# Patient Record
Sex: FEMALE | Race: WHITE | NOT HISPANIC OR LATINO | Employment: UNEMPLOYED | URBAN - METROPOLITAN AREA
[De-identification: names, ages, dates, MRNs, and addresses within clinical notes are randomized per-mention and may not be internally consistent; named-entity substitution may affect disease eponyms.]

---

## 2024-05-06 ENCOUNTER — APPOINTMENT (OUTPATIENT)
Dept: CARDIOLOGY | Facility: CLINIC | Age: 84
End: 2024-05-06
Payer: MEDICARE

## 2024-05-15 ENCOUNTER — APPOINTMENT (OUTPATIENT)
Dept: CARDIOLOGY | Facility: CLINIC | Age: 84
End: 2024-05-15
Payer: MEDICARE

## 2024-05-21 PROBLEM — Z86.0100 HISTORY OF COLON POLYPS: Status: ACTIVE | Noted: 2024-05-21

## 2024-05-21 PROBLEM — M47.816 LUMBAR SPONDYLOSIS: Status: ACTIVE | Noted: 2024-05-21

## 2024-05-21 PROBLEM — I25.84 CORONARY ARTERY CALCIFICATION: Status: ACTIVE | Noted: 2024-05-21

## 2024-05-21 PROBLEM — E05.90 HYPERTHYROIDISM: Status: ACTIVE | Noted: 2024-05-21

## 2024-05-21 PROBLEM — R79.89: Status: ACTIVE | Noted: 2024-05-21

## 2024-05-21 PROBLEM — Z86.010 HISTORY OF COLON POLYPS: Status: ACTIVE | Noted: 2024-05-21

## 2024-05-21 PROBLEM — Z86.69 H/O: BELL'S PALSY: Status: ACTIVE | Noted: 2024-05-21

## 2024-05-21 PROBLEM — E11.29 DIABETES MELLITUS WITH MICROALBUMINURIA (MULTI): Status: ACTIVE | Noted: 2024-05-21

## 2024-05-21 PROBLEM — L98.9 CHANGING SKIN LESION: Status: ACTIVE | Noted: 2024-05-21

## 2024-05-21 PROBLEM — M85.9 DISORDER OF BONE DENSITY AND STRUCTURE, UNSPECIFIED: Status: ACTIVE | Noted: 2024-05-21

## 2024-05-21 PROBLEM — I25.10 CORONARY ARTERY CALCIFICATION: Status: ACTIVE | Noted: 2024-05-21

## 2024-05-21 PROBLEM — I10 HYPERTENSION: Status: ACTIVE | Noted: 2024-05-21

## 2024-05-21 PROBLEM — E55.9 VITAMIN D DEFICIENCY: Status: ACTIVE | Noted: 2024-05-21

## 2024-05-21 PROBLEM — M19.90 ARTHRITIS: Status: ACTIVE | Noted: 2024-05-21

## 2024-05-21 PROBLEM — L60.2 NAIL HYPERTROPHY: Status: ACTIVE | Noted: 2024-05-21

## 2024-05-21 PROBLEM — R80.9 DIABETES MELLITUS WITH MICROALBUMINURIA (MULTI): Status: ACTIVE | Noted: 2024-05-21

## 2024-05-21 PROBLEM — M17.9 OSTEOARTHROSIS OF KNEE: Status: ACTIVE | Noted: 2024-05-21

## 2024-05-21 RX ORDER — GLIMEPIRIDE 4 MG/1
4 TABLET ORAL
COMMUNITY
Start: 2018-09-12

## 2024-05-21 RX ORDER — ASPIRIN 81 MG/1
81 TABLET ORAL DAILY
COMMUNITY
Start: 2019-08-02

## 2024-05-21 RX ORDER — LISINOPRIL AND HYDROCHLOROTHIAZIDE 20; 25 MG/1; MG/1
1 TABLET ORAL DAILY
COMMUNITY
Start: 2018-09-12 | End: 2024-05-29 | Stop reason: WASHOUT

## 2024-05-21 RX ORDER — ATORVASTATIN CALCIUM 40 MG/1
40 TABLET, FILM COATED ORAL DAILY
COMMUNITY
Start: 2019-11-01

## 2024-05-21 RX ORDER — FLUTICASONE PROPIONATE 50 MCG
SPRAY, SUSPENSION (ML) NASAL
COMMUNITY
Start: 2018-09-12

## 2024-05-21 RX ORDER — PIOGLITAZONEHYDROCHLORIDE 45 MG/1
45 TABLET ORAL DAILY
COMMUNITY
Start: 2018-09-12 | End: 2024-05-29 | Stop reason: WASHOUT

## 2024-05-21 RX ORDER — CHOLECALCIFEROL (VITAMIN D3) 125 MCG
50 TABLET ORAL
COMMUNITY
Start: 2018-09-12

## 2024-05-21 RX ORDER — BLOOD-GLUCOSE METER
EACH MISCELLANEOUS 2 TIMES DAILY
COMMUNITY
Start: 2019-03-11

## 2024-05-21 RX ORDER — AMOXICILLIN 500 MG/1
500 CAPSULE ORAL
COMMUNITY
Start: 2020-08-11 | End: 2024-05-29 | Stop reason: WASHOUT

## 2024-05-21 RX ORDER — LISINOPRIL 20 MG/1
20 TABLET ORAL DAILY
COMMUNITY
Start: 2020-03-03

## 2024-05-21 RX ORDER — PHENOL 1.4 %
1 AEROSOL, SPRAY (ML) MUCOUS MEMBRANE DAILY
COMMUNITY
Start: 2019-07-16

## 2024-05-21 RX ORDER — AMLODIPINE BESYLATE 5 MG/1
5 TABLET ORAL DAILY
COMMUNITY
Start: 2018-09-12 | End: 2024-05-29 | Stop reason: WASHOUT

## 2024-05-22 ENCOUNTER — APPOINTMENT (OUTPATIENT)
Dept: CARDIOLOGY | Facility: CLINIC | Age: 84
End: 2024-05-22
Payer: MEDICARE

## 2024-05-29 ENCOUNTER — OFFICE VISIT (OUTPATIENT)
Dept: CARDIOLOGY | Facility: CLINIC | Age: 84
End: 2024-05-29
Payer: MEDICARE

## 2024-05-29 VITALS
HEIGHT: 63 IN | HEART RATE: 76 BPM | WEIGHT: 175 LBS | SYSTOLIC BLOOD PRESSURE: 140 MMHG | DIASTOLIC BLOOD PRESSURE: 90 MMHG | BODY MASS INDEX: 31.01 KG/M2 | OXYGEN SATURATION: 98 %

## 2024-05-29 DIAGNOSIS — L98.9 CHANGING SKIN LESION: Primary | ICD-10-CM

## 2024-05-29 PROCEDURE — 3080F DIAST BP >= 90 MM HG: CPT | Performed by: INTERNAL MEDICINE

## 2024-05-29 PROCEDURE — 1036F TOBACCO NON-USER: CPT | Performed by: INTERNAL MEDICINE

## 2024-05-29 PROCEDURE — 99203 OFFICE O/P NEW LOW 30 MIN: CPT | Performed by: INTERNAL MEDICINE

## 2024-05-29 PROCEDURE — 1159F MED LIST DOCD IN RCRD: CPT | Performed by: INTERNAL MEDICINE

## 2024-05-29 PROCEDURE — 3077F SYST BP >= 140 MM HG: CPT | Performed by: INTERNAL MEDICINE

## 2024-05-29 ASSESSMENT — ENCOUNTER SYMPTOMS
GASTROINTESTINAL NEGATIVE: 1
NEUROLOGICAL NEGATIVE: 1
CONSTITUTIONAL NEGATIVE: 1
MUSCULOSKELETAL NEGATIVE: 1
COLOR CHANGE: 1
PSYCHIATRIC NEGATIVE: 1
HEMATOLOGIC/LYMPHATIC NEGATIVE: 1
RESPIRATORY NEGATIVE: 1

## 2024-05-29 NOTE — PROGRESS NOTES
"Subjective   Patient ID: Emily Silva is a 83 y.o. female who presents for No chief complaint on file..    HPI   Very pleasant 83 year old female presents with lower extremity discoloration and \"weeping\" legs. Denies itchiness. Notes these sx started a year ago and are on and off. Denies any nocturnal pain that wakes her up from sleep. Denies any bruising or pain.   Other factors include diabetes and hypertension. Denies fevers, chills, night sweats.     Review of Systems   Constitutional: Negative.    HENT: Negative.     Respiratory: Negative.     Cardiovascular:  Positive for leg swelling.   Gastrointestinal: Negative.    Musculoskeletal: Negative.    Skin:  Positive for color change.   Neurological: Negative.    Hematological: Negative.    Psychiatric/Behavioral: Negative.         Objective   /90 (BP Location: Left arm, Patient Position: Sitting)   Pulse 76   Ht 1.6 m (5' 3\")   Wt 79.4 kg (175 lb)   SpO2 98%   BMI 31.00 kg/m²     Physical Exam  Vitals reviewed.   Constitutional:       Appearance: Normal appearance.   HENT:      Head: Normocephalic and atraumatic.      Nose: Nose normal.   Cardiovascular:      Rate and Rhythm: Normal rate and regular rhythm.      Pulses: Normal pulses.      Heart sounds: Normal heart sounds.   Pulmonary:      Effort: Pulmonary effort is normal.      Breath sounds: Normal breath sounds.   Musculoskeletal:      Right lower leg: Edema present.      Left lower leg: Edema present.   Skin:     Findings: Rash present.   Neurological:      General: No focal deficit present.      Mental Status: She is alert and oriented to person, place, and time.   Psychiatric:         Mood and Affect: Mood normal.         Behavior: Behavior normal.         Assessment/Plan   There are no diagnoses linked to this encounter.  83 year old female patient with evidence of high venous pressure in bilateral lower extremities presents here today with lower extremity discoloration. Patient's leg is " swollen, she has non-pitting edema bilaterally, circumferential rash affecting both legs at the same level- mid meta tarsal all the way below the knee, same pattern and area as the compression garment, most likely contact dermatitis from compression stockings.     1- Patient will benefit from TUBIGRIP compression socks. Prescription provided.  In addition to skin care and applying hydrocortisone cream to what appears to be contact dermatitis we will follow-up with the patient in 2 weeks from now to see how she is progressing

## 2024-06-20 ENCOUNTER — APPOINTMENT (OUTPATIENT)
Dept: CARDIOLOGY | Facility: CLINIC | Age: 84
End: 2024-06-20
Payer: MEDICARE

## 2024-06-20 VITALS
BODY MASS INDEX: 31.01 KG/M2 | HEIGHT: 63 IN | HEART RATE: 77 BPM | SYSTOLIC BLOOD PRESSURE: 140 MMHG | WEIGHT: 175 LBS | OXYGEN SATURATION: 97 % | DIASTOLIC BLOOD PRESSURE: 70 MMHG

## 2024-06-20 DIAGNOSIS — I89.0 LYMPHEDEMA: Primary | ICD-10-CM

## 2024-06-20 DIAGNOSIS — M47.816 LUMBAR SPONDYLOSIS: ICD-10-CM

## 2024-06-20 PROCEDURE — 3077F SYST BP >= 140 MM HG: CPT | Performed by: INTERNAL MEDICINE

## 2024-06-20 PROCEDURE — 99213 OFFICE O/P EST LOW 20 MIN: CPT | Performed by: INTERNAL MEDICINE

## 2024-06-20 PROCEDURE — 3078F DIAST BP <80 MM HG: CPT | Performed by: INTERNAL MEDICINE

## 2024-06-20 PROCEDURE — 1159F MED LIST DOCD IN RCRD: CPT | Performed by: INTERNAL MEDICINE

## 2024-06-20 PROCEDURE — 1036F TOBACCO NON-USER: CPT | Performed by: INTERNAL MEDICINE

## 2024-06-20 RX ORDER — DAPAGLIFLOZIN 10 MG/1
5 TABLET, FILM COATED ORAL EVERY 24 HOURS
COMMUNITY

## 2024-06-20 ASSESSMENT — ENCOUNTER SYMPTOMS
COLOR CHANGE: 1
PSYCHIATRIC NEGATIVE: 1
MUSCULOSKELETAL NEGATIVE: 1
HEMATOLOGIC/LYMPHATIC NEGATIVE: 1
CONSTITUTIONAL NEGATIVE: 1
NEUROLOGICAL NEGATIVE: 1
GASTROINTESTINAL NEGATIVE: 1
RESPIRATORY NEGATIVE: 1

## 2024-06-20 NOTE — PROGRESS NOTES
"Subjective   Patient ID: Emily Silva is a 83 y.o. female who presents for follow-up    HPI   Very pleasant 83 year old female presents with lower extremity discoloration and \"weeping\" legs. Denies itchiness. Notes these sx started a year ago and are on and off. Denies any nocturnal pain that wakes her up from sleep. Denies any bruising or pain.   Other factors include diabetes and hypertension. Denies fevers, chills, night sweats. Patient has been wearing the Tubigrips and the weeping has stopped. Still experiences pain and notes her legs are very red.     Review of Systems   Constitutional: Negative.    HENT: Negative.     Respiratory: Negative.     Cardiovascular:  Positive for leg swelling.   Gastrointestinal: Negative.    Musculoskeletal: Negative.    Skin:  Positive for color change.   Neurological: Negative.    Hematological: Negative.    Psychiatric/Behavioral: Negative.       Evidence of venous inflammation  Objective   /70 (BP Location: Right arm, Patient Position: Sitting)   Pulse 77   Ht 1.6 m (5' 3\")   Wt 79.4 kg (175 lb)   SpO2 97%   BMI 31.00 kg/m²     Physical Exam  Vitals reviewed.   Constitutional:       Appearance: Normal appearance.   HENT:      Head: Normocephalic and atraumatic.      Nose: Nose normal.   Cardiovascular:      Rate and Rhythm: Normal rate and regular rhythm.      Pulses: Normal pulses.      Heart sounds: Normal heart sounds.   Pulmonary:      Effort: Pulmonary effort is normal.      Breath sounds: Normal breath sounds.   Musculoskeletal:      Right lower leg: Edema present.      Left lower leg: Edema present.   Skin:     Findings: Rash present.   Neurological:      General: No focal deficit present.      Mental Status: She is alert and oriented to person, place, and time.   Psychiatric:         Mood and Affect: Mood normal.         Behavior: Behavior normal.     Evidence of venous inflammation, bilateral, evidence of secondary lymphedema with nonpitting " edema    Assessment/Plan   There are no diagnoses linked to this encounter.  83 year old female patient with evidence of high venous pressure in bilateral lower extremities presents here today with lower extremity discoloration. Patient's leg is swollen, she has non-pitting edema bilaterally, circumferential rash affecting both legs at the same level- mid meta tarsal all the way below the knee, same pattern and area as the compression garment, most likely contact dermatitis from compression stockings. Patient's sx are consistent with venous inflammation, not cellulitis.     1- Patient will benefit from lymphedema PT sessions. Referral provided.   2- Patient will benefit from compression stockings once swelling is down with lymphedema PT.   3- Follow up after for re-assessment in 2 months    Scribe Attestation  By signing my name below, IAdri , Scribe   attest that this documentation has been prepared under the direction and in the presence of Nii Whaley MD.

## 2024-08-06 ENCOUNTER — EVALUATION (OUTPATIENT)
Dept: OCCUPATIONAL THERAPY | Facility: CLINIC | Age: 84
End: 2024-08-06
Payer: MEDICARE

## 2024-08-06 DIAGNOSIS — M79.604 BILATERAL LOWER EXTREMITY PAIN: ICD-10-CM

## 2024-08-06 DIAGNOSIS — M62.81 GENERALIZED MUSCLE WEAKNESS: ICD-10-CM

## 2024-08-06 DIAGNOSIS — M79.605 BILATERAL LOWER EXTREMITY PAIN: ICD-10-CM

## 2024-08-06 DIAGNOSIS — I89.0 LYMPHEDEMA OF BOTH LOWER EXTREMITIES: Primary | ICD-10-CM

## 2024-08-06 DIAGNOSIS — M25.672 STIFFNESS OF BOTH ANKLE JOINTS: ICD-10-CM

## 2024-08-06 DIAGNOSIS — M25.671 STIFFNESS OF BOTH ANKLE JOINTS: ICD-10-CM

## 2024-08-06 PROCEDURE — 97166 OT EVAL MOD COMPLEX 45 MIN: CPT | Mod: GO

## 2024-08-06 PROCEDURE — 97535 SELF CARE MNGMENT TRAINING: CPT | Mod: GO

## 2024-08-06 ASSESSMENT — ENCOUNTER SYMPTOMS
DEPRESSION: 0
LOSS OF SENSATION IN FEET: 0
OCCASIONAL FEELINGS OF UNSTEADINESS: 0

## 2024-08-06 ASSESSMENT — ACTIVITIES OF DAILY LIVING (ADL): HOME_MANAGEMENT_TIME_ENTRY: 10

## 2024-08-06 NOTE — PROGRESS NOTES
Occupational Therapy    Occupational Therapy Evaluation    Name: Emily Silva  MRN: [id number]  : 1940  Date: 2024  Visit #1    Time Entry:  Time Calculation  Start Time: 903  Stop Time: 958  Time Calculation (min): 55 min  OT Evaluation Time Entry  OT Evaluation (Moderate) Time Entry: 45     OT Therapeutic Procedures Time Entry  Self Care/Home Management (ADLs) Time Entry: 10                Assessment:   Pt presents to occupational therapy today with BLE lymphedema, leg tightness/pain, joint stiffness, weakness, impacting  functional mobility, ADLS, IADLS. and leisure activities.   Standardized testing and measures administered today, including LLIS, reveal that the patient has multiple impairments in body structures and functions, activity limitations, and participation restrictions.   The patient has personal factors and comorbidities that may serve as barriers affecting the plan of care, including impaired mobility, h/o leg swelling, seeping, redness, lives in assisted environment.  Skilled OT services are warranted in order to realize measurable change in the above outcome measures and achieve improvements in the patient's functional status and individual goals. The patient verbalized understanding and is in agreement with all goals and plan of care.    Clinical Presentation: evolving with changing characteristics   Level of Complexity: moderate   Problems To Be Addressed: decreased ADL performance, decreased IADL performance, decreased play/leisure performance, decreased knowledge of precautions, decreased knowledge of HEP, edema, pain, decreased ROM/joint mobility, decreased strength, impaired sensation/sensibility and lymphedema.      Plan:   By discharge pt  will achieve the following goals:     -Demonstrate decreased swelling and softened tissue texture in BLEs upon visual inspection and palpation to increase safe functional mobility, ADLS. IADLS, and leisure activities.  -Decrease  circumferential measurements bilateral lower extremity by 0.5-3.5 cm.  -Demonstrate increased knowledge with lymphedema skin care precautions to reduce the risk of infection and exacerbation.  -Demonstrate independence with the self manual lymph drainage (MLD) to enhance lymphatic flow and decongestion of trunk, both lower extremities.  Assess lymphedema pump use.  -Demonstrate independence with self bandaging/compression techniques and independent understanding of the principles and theory of lymphatic compression.  -Be fit with and demonstrate good tolerance to bilateral lower extremity compression garment (to determine style, mmHg) when the patient is stable, at maximal decongestion.  -Demonstrate independence with donning/doffing garment and adherence to wear schedule, adaptive techniques as needed.  -Improve LLIS score by 7-14 points by discharge.  -Decrease pain, tightness lower extremities.  -Improve bilateral LE functional ROM and strength as needed for safe functional mobility.  -Demonstrate independence with home exercise program and compliance with lymphedema exercise precautions.    Assist from family as needed.    Intervention plan include: vasopneumatic device , ADLs, compression bandaging , edema control , education/instruction , garment measuring/fitting , home program , IADLs, manual lymphatic drainage , manual therapy , therapeutic activities and therapeutic exercises.   Frequency and duration: 1-2 time(s) a week, for 8-12 weeks.   Potential to achieve rehab goals is good.     Plan of care was developed with input and agreement by the patient.     Subjective   Current Problem:  1. Lymphedema of both lower extremities        2. Stiffness of both ankle joints        3. Bilateral lower extremity pain        4. Generalized muscle weakness          General:    Lymphedema History   History of present illness. Pt presents to OT, with her daughter Sherice, with BLE lymphedema, leg tightness/pain, joint  "stiffness, weakness, impacting  functional mobility, ADLS, IADLS. and leisure activities.   Pt has tried compression stockings, reports an allergic reaction to stockings, h/o legs seeping, red. Will assess.  Med history: basal cell cancer, skin graft nose; DM II; HTN; HLD; OA; h/o R TKR; 2009 abdominal surgery \"perforated colonoscopy\"; h/o ever.  Per chart (Dr Whaley):  Very pleasant 83 year old female presents with lower extremity discoloration and \"weeping\" legs. Denies itchiness. Notes these sx started a year ago and are on and off. Denies any nocturnal pain that wakes her up from sleep. Denies any bruising or pain.   Other factors include diabetes and hypertension. Denies fevers, chills, night sweats. Patient has been wearing the Tubigrips and the weeping has stopped. Still experiences pain and notes her legs are very red.    Patient's sx are consistent with venous inflammation, not cellulitis.   Living Environment:   Pt lives in Independent Living portion of Assisted Living.   Pt daughter lives near by, is supportive.  Pt walks with rollator walker, getting PT to improve mobility.  Pt manages care, has assist for home management.    Precautions:   Fall risk  STEADI = 6     Pain Assessment:   BLEs tight, swelling 4/10    Outcome Measures:  LLIS= 16    Objective   ROM:   Ankles tight, swollen, stiff.   Strength:   Decreased standing, walking tolerance.   Pt uses rollator walker  Sensation:   BLE grossly intact  Lower Extremity (Skin Appearance/Condition and Girth):   Dryness    Fibrotic edema lower legs, ankles, feet   Pitting edema   Hyperkeratosis harder creases ankles, tops of toes   Hyperpigmentation pink, red lower legs, ankles, feet   + Stemmer Sign bilateral 2nd toes   Additional Information:   Lower legs upside down bottle shape    Lower Extremity Girth Measurements:      RLE cm  Superior border of patella (SBP) 49.8  10 cm above SBP -  10cm below SBP 40.3  20cm below SBP 42.6  30cm below SBP 29.0  35cm " below SBP 23.5  Ankle lobule 28.5  Ankle 22.2  Forefoot 23.9    LLE cm  Superior border of patella (SBP) 49.5  10 cm above SBP -  10cm below SBP 40.3  20cm below SBP 43.4  30cm below SBP 31.5  35cm below SBP 24.3  Ankle lobule 29.4  Ankle 22.7  Forefoot 24.2    Treatment:  55 minutes    Evaluation 45    Self Care 10  Pt and her daughter instructed on function of lymphatic system, causes of lymphedema, lymphedema tx/CDT-complete decongestive therapy which includes skin care, manual lymph drainage (MLD), compression and exercise. NLN hand out issued.   Leg elevation instructed.

## 2024-08-08 PROBLEM — M25.671 STIFFNESS OF BOTH ANKLE JOINTS: Status: ACTIVE | Noted: 2024-08-08

## 2024-08-08 PROBLEM — I89.0 LYMPHEDEMA OF BOTH LOWER EXTREMITIES: Status: ACTIVE | Noted: 2024-08-08

## 2024-08-08 PROBLEM — M79.604 BILATERAL LOWER EXTREMITY PAIN: Status: ACTIVE | Noted: 2024-08-08

## 2024-08-08 PROBLEM — M79.605 BILATERAL LOWER EXTREMITY PAIN: Status: ACTIVE | Noted: 2024-08-08

## 2024-08-08 PROBLEM — M25.672 STIFFNESS OF BOTH ANKLE JOINTS: Status: ACTIVE | Noted: 2024-08-08

## 2024-08-08 PROBLEM — M62.81 GENERALIZED MUSCLE WEAKNESS: Status: ACTIVE | Noted: 2024-08-08

## 2024-09-13 ENCOUNTER — TREATMENT (OUTPATIENT)
Dept: OCCUPATIONAL THERAPY | Facility: CLINIC | Age: 84
End: 2024-09-13
Payer: MEDICARE

## 2024-09-13 DIAGNOSIS — M25.672 STIFFNESS OF BOTH ANKLE JOINTS: ICD-10-CM

## 2024-09-13 DIAGNOSIS — I89.0 LYMPHEDEMA OF BOTH LOWER EXTREMITIES: ICD-10-CM

## 2024-09-13 DIAGNOSIS — M79.604 BILATERAL LOWER EXTREMITY PAIN: Primary | ICD-10-CM

## 2024-09-13 DIAGNOSIS — M25.671 STIFFNESS OF BOTH ANKLE JOINTS: ICD-10-CM

## 2024-09-13 DIAGNOSIS — M79.605 BILATERAL LOWER EXTREMITY PAIN: Primary | ICD-10-CM

## 2024-09-13 DIAGNOSIS — M62.81 GENERALIZED MUSCLE WEAKNESS: ICD-10-CM

## 2024-09-13 PROCEDURE — 97535 SELF CARE MNGMENT TRAINING: CPT | Mod: GO | Performed by: OCCUPATIONAL THERAPIST

## 2024-09-13 ASSESSMENT — ACTIVITIES OF DAILY LIVING (ADL): HOME_MANAGEMENT_TIME_ENTRY: 53

## 2024-09-13 NOTE — PROGRESS NOTES
Occupational Therapy    OT Treatment    Patient Name: Emily Silva  MRN: [id number]  Today's Date: 9/13/2024       Assessment:  OT trialed use of lymphapress to LLE in the clinic.  Pt tolerated well and demonstrated decreased circumferential measurements with use.  OT will pursue for home use; will further assess     Plan:  Continue POC as established in initial evaluation      Subjective   Current Problem:  1. Bilateral lower extremity pain        2. Generalized muscle weakness        3. Lymphedema of both lower extremities        4. Stiffness of both ankle joints          Patient reports her legs are getting better        Pain Assessment:   0/10 leg pain       Objective   ROM:   Ankles tight, swollen, stiff.   Strength:   Decreased standing, walking tolerance.   Pt uses rollator walker  Sensation:   BLE grossly intact  Lower Extremity (Skin Appearance/Condition and Girth):   Dryness    Fibrotic edema lower legs, ankles, feet   Pitting edema   Hyperkeratosis harder creases ankles, tops of toes   Hyperpigmentation pink, red lower legs, ankles, feet   + Stemmer Sign bilateral 2nd toes   Additional Information:   Lower legs upside down bottle shape    Lower Extremity Girth Measurements:            R Superior border of patella (SBP)  49.3  R 10cm above SBP   R 20cm above SBP   R 10cm below SBP  40.1  R 20cm below SBP  41.9  R 30cm below SBP 28.2   R 35cm below SBP 22.7  R Ankle lobule 31.1   R Ankle  23.8  R Forefoot 23.4       LLE cm  Superior border of patella (SBP) 49.5  10 cm above SBP -  10cm below SBP 39.0   20cm below SBP 43.0  30cm below SBP 30.7  35cm below SBP 24.0  Ankle lobule 29.4   Ankle 23.3  Forefoot 24.6       LLE cm post use of lymphapress at 30 mmHg for 15 minutes   Superior border of patella (SBP) 49.6  10 cm above SBP -  10cm below SBP 39.3  20cm below SBP 42.3  30cm below SBP 30.2  35cm below SBP 23.3  Ankle lobule 29.2  Ankle 22.3  Forefoot 24.2    Treatment:  53 minutes       OT assessed skin  and took circumferential measurements of BLEs (LLE pre and post use of lymphapress)     OT applied lymphapress to LLE at 30 mmHg.  While lymphapress applied, OT provided pt education:   - educated patient on protocols with the lymphapress; how to use, precautions and completion schedule   -educated on various compression options.  OT recommended velcro compression.  OT showed sample of the Ready Wrap and demo'd donning techniques.  Pt expressed interest.  Will send for script   -Educated pt on signs and symptoms of infection and to monitor for cellulitis.      OT applied lotion to BLEs per lymphedema skin care precautions     Post use of lymphapress, OT applied TG  (size F) to BLEs     Pt continues to TG  daily   Pt monitors amount of sodium in diet, OT educates pt on sodium intake per lymphedema protocol, pt expresses carryover   Pt elevates B LE while sleeping in night and as able throughout the day.   Pt states completes issued exercises at home  -Despite compliance with the conservative care, significant symptoms remain

## 2024-09-17 ENCOUNTER — TREATMENT (OUTPATIENT)
Dept: OCCUPATIONAL THERAPY | Facility: CLINIC | Age: 84
End: 2024-09-17
Payer: MEDICARE

## 2024-09-17 DIAGNOSIS — M79.605 BILATERAL LOWER EXTREMITY PAIN: Primary | ICD-10-CM

## 2024-09-17 DIAGNOSIS — M25.672 STIFFNESS OF BOTH ANKLE JOINTS: ICD-10-CM

## 2024-09-17 DIAGNOSIS — M62.81 GENERALIZED MUSCLE WEAKNESS: ICD-10-CM

## 2024-09-17 DIAGNOSIS — I89.0 LYMPHEDEMA OF BOTH LOWER EXTREMITIES: ICD-10-CM

## 2024-09-17 DIAGNOSIS — M79.604 BILATERAL LOWER EXTREMITY PAIN: Primary | ICD-10-CM

## 2024-09-17 DIAGNOSIS — M25.671 STIFFNESS OF BOTH ANKLE JOINTS: ICD-10-CM

## 2024-09-17 PROCEDURE — 97140 MANUAL THERAPY 1/> REGIONS: CPT | Mod: GO | Performed by: OCCUPATIONAL THERAPIST

## 2024-09-17 PROCEDURE — 97535 SELF CARE MNGMENT TRAINING: CPT | Mod: GO | Performed by: OCCUPATIONAL THERAPIST

## 2024-09-17 ASSESSMENT — ACTIVITIES OF DAILY LIVING (ADL): HOME_MANAGEMENT_TIME_ENTRY: 29

## 2024-09-17 NOTE — PROGRESS NOTES
How Severe Is Your Skin Lesion?: mild Occupational Therapy      OT Treatment    Patient Name: Emily Silva  MRN: [id number]  Today's Date: 9/17/2024       Assessment:  OT provided pt with signed script for velcro compression stockings.  Pt to be fitted at [facility].  Will further assess.     Plan:  Continue POC as established in initial evaluation      Subjective   Current Problem:  1. Bilateral lower extremity pain        2. Generalized muscle weakness        3. Lymphedema of both lower extremities        4. Stiffness of both ankle joints          Patient reports her legs are getting better        Pain Assessment:   0/10 leg pain       Objective   ROM:   Ankles tight, swollen, stiff.   Strength:   Decreased standing, walking tolerance.   Pt uses rollator walker  Sensation:   BLE grossly intact  Lower Extremity (Skin Appearance/Condition and Girth):   Dryness    Fibrotic edema lower legs, ankles, feet   Pitting edema   Hyperkeratosis harder creases ankles, tops of toes   Hyperpigmentation pink, red lower legs, ankles, feet   + Stemmer Sign bilateral 2nd toes   Additional Information:   Lower legs upside down bottle shape    Lower Extremity Girth Measurements:         No measurements on today's date     Treatment:  54 minutes    Self Care: 29     OT assessed skin.   OT noted light bleeding from lateral aspect of left lower.  OT apply curity bandage over spot.     OT provided patient with signed script for velcro compression garments.  OT educated pt and dtr on process of obtaining compression garment.  OT educated pt to schedule appointment with  at [facility].  OT provided patient pt with contact info for fitter at the [facility], [place].  Pt and dtr expressed plan to schedule appointment with fitter.    OT called rep from [employer], during treatment to gather patient intake for the [employer].  Order being processed.     OT applied TG  to BLEs on today's date     Pt continues to TG  daily   Pt monitors  Have Your Skin Lesions Been Treated?: not been treated amount of sodium in diet, OT educates pt on sodium intake per lymphedema protocol, pt expresses carryover   Pt elevates B LE while sleeping in night and as able throughout the day.   Pt states completes issued exercises at home  Despite compliance with compression, elevation and issued home programs; swelling still remains in BLEs.     Manual Therapy: 25    OT applied lymphapress to LLE at 30 mmHg.  Simultaneously,  OT provided Manual Lymph Drainage to RLE.  Softened tissue texture post treatment.   -Softened tissue texture post treatmetn      Is This A New Presentation, Or A Follow-Up?: Skin Lesion Which Family Member (Optional)?: Father

## 2024-10-02 ENCOUNTER — TREATMENT (OUTPATIENT)
Dept: OCCUPATIONAL THERAPY | Facility: CLINIC | Age: 84
End: 2024-10-02
Payer: MEDICARE

## 2024-10-02 DIAGNOSIS — I89.0 LYMPHEDEMA OF BOTH LOWER EXTREMITIES: Primary | ICD-10-CM

## 2024-10-02 DIAGNOSIS — M25.671 STIFFNESS OF BOTH ANKLE JOINTS: ICD-10-CM

## 2024-10-02 DIAGNOSIS — M25.672 STIFFNESS OF BOTH ANKLE JOINTS: ICD-10-CM

## 2024-10-02 DIAGNOSIS — M79.604 BILATERAL LOWER EXTREMITY PAIN: ICD-10-CM

## 2024-10-02 DIAGNOSIS — M62.81 GENERALIZED MUSCLE WEAKNESS: ICD-10-CM

## 2024-10-02 DIAGNOSIS — M79.605 BILATERAL LOWER EXTREMITY PAIN: ICD-10-CM

## 2024-10-02 PROCEDURE — 97110 THERAPEUTIC EXERCISES: CPT | Mod: GO,CO

## 2024-10-02 PROCEDURE — 97535 SELF CARE MNGMENT TRAINING: CPT | Mod: GO,CO

## 2024-10-02 ASSESSMENT — ACTIVITIES OF DAILY LIVING (ADL): HOME_MANAGEMENT_TIME_ENTRY: 17

## 2024-10-02 NOTE — PROGRESS NOTES
Occupational Therapy      OT Treatment    Patient Name: Emily Silva  MRN: [id number]  Today's Date: 10/2/2024     Time In 0807a  Time Out 0849a  Total Time 42 mins    Assessment:  Pt appears to tolerate treatment well with no complaints of pain. Using teachback method pt demonstrates good ability to complete home exercise program, handout issued. Pt going to get fit for compression stockings, velcro closing.      Plan:  Continue POC as established in initial evaluation      Subjective   Current Problem:  1. Lymphedema of both lower extremities        2. Generalized muscle weakness        3. Bilateral lower extremity pain        4. Stiffness of both ankle joints          Patient reports no changes since last visit. Pt daughter states discoloration BLE is improving       Pain Assessment:   0/10 leg pain       Objective   ROM:   Ankles tight, swollen, stiff.   Strength:   Decreased standing, walking tolerance.   Pt uses rollator walker  Sensation:   BLE grossly intact  Lower Extremity (Skin Appearance/Condition and Girth):   Dryness    Fibrotic edema lower legs, ankles, feet   Pitting edema   Hyperkeratosis harder creases ankles, tops of toes   Hyperpigmentation pink, red lower legs, ankles, feet   + Stemmer Sign bilateral 2nd toes   Additional Information:   Lower legs upside down bottle shape    Lower Extremity Girth Measurements:         RLE cm  Superior border of patella (SBP) 48.9  10 cm above SBP -  10cm below SBP 39.5  20cm below SBP 42.2  30cm below SBP 28.8  35cm below SBP 23.2  Ankle lobule 28.2  Ankle 22.6  Forefoot 23.4     LLE cm  Superior border of patella (SBP) 49.1  10 cm above SBP -  10cm below SBP 38.9  20cm below SBP 42.9  30cm below SBP 27.4  35cm below SBP 23.2  Ankle lobule 28.0  Ankle 22.6  Forefoot 24.0    Decreases B LE, L LE > R LE  Treatment: 42 minutes    Self Care: 17    WOMACK assesses skin, takes measurements.   WOMACK educates pt on importance of continued exercise and pump use, pt  verbalizes understanding.      Pt continues to TG  daily   Pt monitors amount of sodium in diet  Pt elevates B LE while sleeping in night and as able throughout the day.   Pt states completes issued exercises at home  Despite compliance with compression, elevation and issued home programs; swelling still remains in BLEs.     Ther Ex 25  Heel raises 2x15  Toe raises 2x15  Towel scrunch 2x10 each foot  Marches 3x8 each leg  Hip abduction 3x8 green theraband issued  Hip adduction 3x8    Home exercise program reviewed, handout issued

## 2024-10-15 ENCOUNTER — TREATMENT (OUTPATIENT)
Dept: OCCUPATIONAL THERAPY | Facility: CLINIC | Age: 84
End: 2024-10-15
Payer: MEDICARE

## 2024-10-15 DIAGNOSIS — M79.605 BILATERAL LOWER EXTREMITY PAIN: Primary | ICD-10-CM

## 2024-10-15 DIAGNOSIS — M62.81 GENERALIZED MUSCLE WEAKNESS: ICD-10-CM

## 2024-10-15 DIAGNOSIS — M79.604 BILATERAL LOWER EXTREMITY PAIN: Primary | ICD-10-CM

## 2024-10-15 DIAGNOSIS — I89.0 LYMPHEDEMA OF BOTH LOWER EXTREMITIES: ICD-10-CM

## 2024-10-15 DIAGNOSIS — M25.671 STIFFNESS OF BOTH ANKLE JOINTS: ICD-10-CM

## 2024-10-15 DIAGNOSIS — M25.672 STIFFNESS OF BOTH ANKLE JOINTS: ICD-10-CM

## 2024-10-15 PROCEDURE — 97535 SELF CARE MNGMENT TRAINING: CPT | Mod: GO

## 2024-10-15 PROCEDURE — 97110 THERAPEUTIC EXERCISES: CPT | Mod: GO

## 2024-10-15 ASSESSMENT — ACTIVITIES OF DAILY LIVING (ADL): HOME_MANAGEMENT_TIME_ENTRY: 40

## 2024-10-15 NOTE — PROGRESS NOTES
Occupational Therapy    Occupational Therapy Treatment    Name: Emily Silva  MRN: [id number]  : 1940  Date: 10/15/24  Visit #5    Time Entry:  Time Calculation  Start Time: 801  Stop Time: 856  Time Calculation (min): 55 min        OT Therapeutic Procedures Time Entry  Self Care/Home Management (ADLs) Time Entry: 40  Therapeutic Exercise Time Entry: 15                Assessment:  Pt appears to tolerate treatment well with no complaints of pain.   OT initiated short stretch wraps, will transition to compression garments (velcro).  Good tolerance to wraps.  Leg skin health improving.  Instruction to pt and her daughter.      Plan:  Continue POC as established in initial evaluation: BLE lymphedema complete decongestive therapy (CDT), decrease swelling, improve functional mobility, instruct home management.  See pt in 4 weeks.    Subjective   Current Problem:  1. Bilateral lower extremity pain        2. Generalized muscle weakness        3. Lymphedema of both lower extremities        4. Stiffness of both ankle joints          Patient reports she received her pump, has been using 1-2x/day (1 hr @40 mmHg).     Pain Assessment:   0/10 leg pain     Objective   ROM:   Ankles tight, swollen, stiff.   Strength:   Decreased standing, walking tolerance.   Pt uses rollator walker  Sensation:   BLE grossly intact  Lower Extremity (Skin Appearance/Condition and Girth):   Dryness    Fibrotic edema lower legs, ankles, feet   Pitting edema   Hyperkeratosis harder creases ankles, tops of toes   Hyperpigmentation pink, red lower legs, ankles, feet, improving.  + Stemmer Sign bilateral 2nd toes   Additional Information:   Lower legs upside down bottle shape  Left lower leg above lat ankle with mild weeping.    Lower Extremity Girth Measurements:         RLE cm  Superior border of patella (SBP) 50.1  10 cm above SBP -  10cm below SBP 39.6  20cm below SBP 42.7  30cm below SBP 32.3  35cm below SBP 24.8  Ankle lobule  29.6  Ankle 22.4  Forefoot 23.8     LLE cm  Superior border of patella (SBP) 49.8  10 cm above SBP -  10cm below SBP 39.6  20cm below SBP 42.7  30cm below SBP 32.1  35cm below SBP 24.7 -dressing  Ankle lobule 28.6  Ankle 24.9 -dressing  Forefoot 24.6    Fluctuations noted.      Treatment: 55 minutes    Self Care: 40    OT assess skin, tissue texture, takes measurements.     Left lower leg above lat ankle weeping, OT cover with gauze, Tricofix cover.    OT add short stretch wraps today, instruct rationale:  -apply stockinette base layer, apply 1-10 cm short stretch wrap ankle to below knee, light/mod even pressure.  Use tape to secure (no clip/pins).  Good fit noted.  OT instruct pt wear daily, pt to remove at night.  Handout for care and instruction reviewed, issued.    Pt using Lymphapress daily (1-2x/day, 60 mins @ 40 mmHg).  OT issue stockinette full leg liners to use with pumps.    Pt monitors amount of sodium in diet  Pt elevates B LE while sleeping in night and as able throughout the day.   Pt states completes issued exercises at home    Ther Ex 15  Pt following prior instructed exercises:  Heel raises   Toe raises   Towel scrunches  Marches each leg  Hip abduction -green theraband issued  Hip adduction     Add:   Ankle circles x10  Knee extensions x5  Ankle alphabet 5-10x  Written hand out issued.    OT reinforce importance of exercise and movement to support lymphedema decongestion.

## 2024-12-04 ENCOUNTER — APPOINTMENT (OUTPATIENT)
Dept: OCCUPATIONAL THERAPY | Facility: CLINIC | Age: 84
End: 2024-12-04
Payer: MEDICARE